# Patient Record
Sex: FEMALE | Race: WHITE | NOT HISPANIC OR LATINO | Employment: UNEMPLOYED | URBAN - METROPOLITAN AREA
[De-identification: names, ages, dates, MRNs, and addresses within clinical notes are randomized per-mention and may not be internally consistent; named-entity substitution may affect disease eponyms.]

---

## 2019-05-28 PROBLEM — Z98.891 HISTORY OF C-SECTION: Status: ACTIVE | Noted: 2019-05-28

## 2019-05-28 PROBLEM — O09.899 SUPERVISION OF OTHER HIGH RISK PREGNANCY, ANTEPARTUM: Status: ACTIVE | Noted: 2019-05-28

## 2019-05-28 PROBLEM — Z15.89 MTHFR GENE MUTATION: Status: ACTIVE | Noted: 2019-05-28

## 2019-05-28 PROBLEM — Z86.718 HISTORY OF DVT (DEEP VEIN THROMBOSIS): Status: ACTIVE | Noted: 2019-05-28

## 2019-05-28 PROBLEM — O09.299 HISTORY OF PRE-ECLAMPSIA IN PRIOR PREGNANCY, CURRENTLY PREGNANT: Status: ACTIVE | Noted: 2019-05-28

## 2019-07-23 PROBLEM — Z91.199 NONCOMPLIANCE: Status: ACTIVE | Noted: 2019-07-23

## 2019-08-02 ENCOUNTER — SOCIAL WORK (OUTPATIENT)
Dept: ADMINISTRATIVE | Facility: OTHER | Age: 24
End: 2019-08-02

## 2019-08-02 NOTE — PROGRESS NOTES
SW received paperwork from [facility] regarding prior authorization on pt's medication(Enoxaparin). SW made phone call to pt's pharmacy([phone]) spoke with Sumi-pharmacy representative regarding pt's medication(Enoxaparin);per Sumi no prior authorization is needed.Sumi informed SW that she will have to order pt's medication and it will be in Monday(7/5/19) but pt can go to the [facility] on [address] and they will give her a 5 day supply at no cost. SW made phone call to pt([phone]) unable to reach left her a message regarding the above. No other needs identified at this time.    Lacie KaufmanMSW  Pager#[phone]

## 2019-10-02 ENCOUNTER — SOCIAL WORK (OUTPATIENT)
Dept: ADMINISTRATIVE | Facility: OTHER | Age: 24
End: 2019-10-02

## 2019-10-02 NOTE — PROGRESS NOTES
FRANNY received paperwork from [facility] for a prior authorization on pt's medication(Enoxaparin). FRANNY [phone][facility] a [facility] pt's medication(Enoxaparin);per Floyd pt has not use their pharmacy since 8/18.SW look into epic regarding pt last clinic visit which was yesterday(10/01/19) and pt informed MD she has not been take the medication. FRANNY made phone call to pt([phone]) unable to reach left a message regarding the above. No other needs identified at this time.    Lacie Kaufman,MSW  Pager#[phone]

## 2019-10-08 PROBLEM — O36.5930 POOR FETAL GROWTH AFFECTING MANAGEMENT OF MOTHER IN THIRD TRIMESTER: Status: ACTIVE | Noted: 2019-10-08

## 2019-10-28 PROBLEM — O09.299 HISTORY OF PRE-ECLAMPSIA IN PRIOR PREGNANCY, CURRENTLY PREGNANT: Status: RESOLVED | Noted: 2019-05-28 | Resolved: 2019-10-28

## 2019-10-28 PROBLEM — O36.5930 POOR FETAL GROWTH AFFECTING MANAGEMENT OF MOTHER IN THIRD TRIMESTER: Status: RESOLVED | Noted: 2019-10-08 | Resolved: 2019-10-28

## 2019-10-28 PROBLEM — Z98.891 HISTORY OF C-SECTION: Status: RESOLVED | Noted: 2019-05-28 | Resolved: 2019-10-28

## 2019-10-28 PROBLEM — O09.899 SUPERVISION OF OTHER HIGH RISK PREGNANCY, ANTEPARTUM: Status: RESOLVED | Noted: 2019-05-28 | Resolved: 2019-10-28

## 2019-12-04 DIAGNOSIS — O09.93 SUPERVISION OF HIGH-RISK PREGNANCY, THIRD TRIMESTER: Primary | ICD-10-CM

## 2019-12-04 DIAGNOSIS — Z3A.34 34 WEEKS GESTATION OF PREGNANCY: ICD-10-CM

## 2019-12-04 DIAGNOSIS — O99.810 ABNORMAL O'SULLIVAN GLUCOSE CHALLENGE TEST, ANTEPARTUM: Primary | ICD-10-CM

## 2019-12-04 DIAGNOSIS — Z36.9 ENCOUNTER FOR ANTENATAL SCREENING OF MOTHER: Primary | ICD-10-CM

## 2019-12-04 DIAGNOSIS — Z3A.35 35 WEEKS GESTATION OF PREGNANCY: Primary | ICD-10-CM

## 2021-11-22 PROBLEM — K02.9 CARIES: Status: ACTIVE | Noted: 2021-11-22
